# Patient Record
Sex: MALE | Race: WHITE | Employment: FULL TIME | URBAN - METROPOLITAN AREA
[De-identification: names, ages, dates, MRNs, and addresses within clinical notes are randomized per-mention and may not be internally consistent; named-entity substitution may affect disease eponyms.]

---

## 2024-07-24 ENCOUNTER — OFFICE VISIT (OUTPATIENT)
Age: 74
End: 2024-07-24
Payer: MEDICARE

## 2024-07-24 VITALS
HEIGHT: 72 IN | OXYGEN SATURATION: 96 % | RESPIRATION RATE: 18 BRPM | TEMPERATURE: 97 F | BODY MASS INDEX: 33.86 KG/M2 | WEIGHT: 250 LBS | HEART RATE: 72 BPM

## 2024-07-24 DIAGNOSIS — H60.391 ACUTE INFECTIVE OTITIS EXTERNA, RIGHT: Primary | ICD-10-CM

## 2024-07-24 PROCEDURE — 99203 OFFICE O/P NEW LOW 30 MIN: CPT | Performed by: PHYSICIAN ASSISTANT

## 2024-07-24 PROCEDURE — G0463 HOSPITAL OUTPT CLINIC VISIT: HCPCS | Performed by: PHYSICIAN ASSISTANT

## 2024-07-24 RX ORDER — OFLOXACIN 3 MG/ML
10 SOLUTION AURICULAR (OTIC) DAILY
Qty: 10 ML | Refills: 0 | Status: SHIPPED | OUTPATIENT
Start: 2024-07-24 | End: 2024-07-24

## 2024-07-24 RX ORDER — M-VIT,TX,IRON,MINS/CALC/FOLIC 27MG-0.4MG
TABLET ORAL
COMMUNITY

## 2024-07-24 RX ORDER — NEOMYCIN SULFATE, POLYMYXIN B SULFATE AND HYDROCORTISONE 10; 3.5; 1 MG/ML; MG/ML; [USP'U]/ML
4 SUSPENSION/ DROPS AURICULAR (OTIC) 4 TIMES DAILY
Qty: 10 ML | Refills: 0 | Status: SHIPPED | OUTPATIENT
Start: 2024-07-24

## 2024-07-24 NOTE — PROGRESS NOTES
[facility]        NAME: Oz Burt is a 73 y.o. male  : 1950    MRN: [id number]  DATE: 2024  TIME: 9:47 AM    Assessment and Plan   Acute infective otitis externa, right [H60.391]  1. Acute infective otitis externa, right  neomycin-polymyxin-hydrocortisone (CORTISPORIN) 0.35%-10,000 units/mL-1% otic suspension    DISCONTINUED: ofloxacin (FLOXIN) 0.3 % otic solution            Patient Instructions     Pt has otitis externa of the right ear which I will treat with topical Cortisporin otic drops and he is to keep ear otherwise dry and avoid any excessive water exposure. Rec OTC analgesics for pain. Once condition is resolved, he should then be seen for cerumen removal.   Follow up with PCP in 3-5 days.  Proceed to  ER if symptoms worsen.    If tests have been performed at [facility], our office will contact you with results if changes need to be made to the care plan discussed with you at the visit.  [facility]    Chief Complaint     Chief Complaint   Patient presents with    Earache     Pain started 10 days ago. Right ear, took his hearing aide and cleaning it and put hydrogen peroxide, starting ,monday he started leaking clear fluid, can't hear well from that ear.         History of Present Illness       Pt presents with over one week hx of acute right ear pain. He wears hearing aids and believes he has wax. He was using hydrogen peroxide in his right ear and now has pain, decreased hearing, otorrhea.         Review of Systems   Review of Systems   Constitutional: Negative.    HENT:  Positive for ear discharge (right), ear pain (right) and hearing loss (right).    Respiratory: Negative.     Cardiovascular: Negative.    Gastrointestinal: Negative.    Genitourinary: Negative.          Current Medications       Current Outpatient Medications:     Cholecalciferol 25 MCG (1000 UT) GO, Chew, Disp: , Rfl:     Menaquinone-7 40 MCG TABS, Take by  mouth, Disp: , Rfl:     neomycin-polymyxin-hydrocortisone (CORTISPORIN) 0.35%-10,000 units/mL-1% otic suspension, Administer 4 drops to the right ear 4 (four) times a day, Disp: 10 mL, Rfl: 0    therapeutic multivitamin-minerals (THERAGRAN-M) tablet, , Disp: , Rfl:     Current Allergies     Allergies as of 07/24/2024 - Reviewed 07/24/2024   Allergen Reaction Noted    Rabies vaccine Other (See Comments) 09/26/2008            The following portions of the patient's history were reviewed and updated as appropriate: allergies, current medications, past family history, past medical history, past social history, past surgical history and problem list.     Past Medical History:   Diagnosis Date    Arthritis        Past Surgical History:   Procedure Laterality Date    APPENDECTOMY         Family History   Problem Relation Age of Onset    Heart disease Mother     Hypertension Mother     Heart disease Father     Hypertension Father          Medications have been verified.        Objective   Pulse 72   Temp (!) 97 °F (36.1 °C)   Resp 18   Ht 6' (1.829 m)   Wt 113 kg (250 lb)   SpO2 96%   BMI 33.91 kg/m²   No LMP for male patient.       Physical Exam     Physical Exam  Vitals reviewed.   Constitutional:       General: He is not in acute distress.     Appearance: He is well-developed.   HENT:      Right Ear: External ear normal.      Left Ear: Tympanic membrane and external ear normal.      Ears:      Comments: Right EAC with canal edema, maceration. Unable to visualize TM. Left EAC with cerumen build-up but not impacted.  Neurological:      Mental Status: He is alert and oriented to person, place, and time.

## 2024-07-30 ENCOUNTER — OFFICE VISIT (OUTPATIENT)
Age: 74
End: 2024-07-30
Payer: MEDICARE

## 2024-07-30 VITALS
HEIGHT: 72 IN | SYSTOLIC BLOOD PRESSURE: 169 MMHG | RESPIRATION RATE: 18 BRPM | BODY MASS INDEX: 33.54 KG/M2 | HEART RATE: 70 BPM | WEIGHT: 247.6 LBS | OXYGEN SATURATION: 99 % | DIASTOLIC BLOOD PRESSURE: 88 MMHG | TEMPERATURE: 97.8 F

## 2024-07-30 DIAGNOSIS — H60.501 ACUTE OTITIS EXTERNA OF RIGHT EAR, UNSPECIFIED TYPE: Primary | ICD-10-CM

## 2024-07-30 PROCEDURE — G0463 HOSPITAL OUTPT CLINIC VISIT: HCPCS

## 2024-07-30 PROCEDURE — 99213 OFFICE O/P EST LOW 20 MIN: CPT

## 2024-07-30 RX ORDER — CIPROFLOXACIN AND DEXAMETHASONE 3; 1 MG/ML; MG/ML
SUSPENSION/ DROPS AURICULAR (OTIC)
COMMUNITY
Start: 2024-07-25

## 2024-07-30 RX ORDER — AMOXICILLIN AND CLAVULANATE POTASSIUM 875; 125 MG/1; MG/1
1 TABLET, FILM COATED ORAL EVERY 12 HOURS SCHEDULED
Qty: 20 TABLET | Refills: 0 | Status: SHIPPED | OUTPATIENT
Start: 2024-07-30 | End: 2024-08-09

## 2024-07-30 NOTE — PATIENT INSTRUCTIONS
Patient Education     Outer Ear Infection ED   General Information   You came to the Emergency Department (ED) for an outer ear infection. You may hear this called sanna’s ear, but you can get it even if you are not swimming. An outer ear infection happens when the skin in the ear canal is scratched or irritated and then gets infected. You may need antibiotics to treat the infection. If you are given ear drops, it is important to take all of them, even if you start to feel better.  What care is needed at home?   Call your regular doctor to let them know you were in the ED. Make a follow-up appointment if you were told to.  Take your ear drops as ordered.  Sit or lie down with your head to the side and the ear that needs the ear drops pointing up.  Pull on your ear to straighten the ear canal.  Gently pull the ear up and toward the back of the head to straighten it. If you are giving the ear drops to a child under 3 years of age, gently pull the earlobe down and toward the back of the head.  Put the correct number of drops in your ear.  Gently press the small skin flap over the ear to help the drops run into the ear.  Continue to sit or lie with your head to the side for 5 minutes.  Your doctor may want you to put a small cotton plug in your ear to help keep the drops in place.  Keep the inside of your ear dry while it heals. You can protect your ear when you shower with a petroleum jelly-coated cotton ball. Avoid swimming for 7 to 10 days.  Do not use in-ear head phones (ear buds) or hearing aids while your ear heals.  When do I need to call the doctor?   Your symptoms are not better within 2 days after starting treatment.  Your ear starts to bleed or drain pus.  You have a fever of 100.4°F (38°C)  You have new or worsening symptoms.  Last Reviewed Date   2020-08-03  Consumer Information Use and Disclaimer   This generalized information is a limited summary of diagnosis, treatment, and/or medication information.  It is not meant to be comprehensive and should be used as a tool to help the user understand and/or assess potential diagnostic and treatment options. It does NOT include all information about conditions, treatments, medications, side effects, or risks that may apply to a specific patient. It is not intended to be medical advice or a substitute for the medical advice, diagnosis, or treatment of a health care provider based on the health care provider's examination and assessment of a patient’s specific and unique circumstances. Patients must speak with a health care provider for complete information about their health, medical questions, and treatment options, including any risks or benefits regarding use of medications. This information does not endorse any treatments or medications as safe, effective, or approved for treating a specific patient. [employer] and its affiliates disclaim any warranty or liability relating to this information or the use thereof. The use of this information is governed by the Terms of Use, available at https://www.[employer].com/en/know/clinical-effectiveness-terms   Copyright   Copyright © 2024 [employer] and its affiliates and/or licensors. All rights reserved.

## 2024-07-30 NOTE — PROGRESS NOTES
[facility]        NAME: Oz Burt is a 73 y.o. male  : 1950    MRN: [id number]  DATE: 2024  TIME: 4:09 PM    Assessment and Plan   Acute otitis externa of right ear, unspecified type [H60.501]  1. Acute otitis externa of right ear, unspecified type  Ambulatory Referral to Otolaryngology    amoxicillin-clavulanate (AUGMENTIN) 875-125 mg per tablet      Patient failed topical treatment with Ciprodex. Will treat with oral Augmentin. Patient must follow up with ENT for further treatment. Patient verbalized understanding. Patient also requested me to look at his left eye as a tree branch hit it a couple of days ago. He stated it bothered him a little bit yesterday, but has improved and he just wanted me to look to make sure I did not see anything on exam. I offered to do fluorescein stain for foreign bodies and patient declines. He also declined lid eversion. He states he will follow up with ophthalmology as needed if his eye is bothering him.   Patient Instructions     Otitis externa diagnosed on exam today.  Antibiotics sent to the pharmacy. Follow up with PCP in 3-5 days if no improvement. Proceed to ER if symptoms worsen.    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 male presenting to the office today complaining of ear pain.   Symptoms have been present for 14 days, and include right ear pain and drainage. Patient was diagnosed 6 days ago with otitis externa, and was placed on Cortisporin. Patient believes drops have helped, but now the pain has returned. He was told he would also need to return for cerumen removal.    He has tried  Cortisporin for his symptoms, some relief.  Sick contacts include: none    Review of Systems     Review of Systems   Constitutional:  Negative for chills and fever.   HENT:  Positive for ear discharge, ear pain and sore throat. Negative for congestion and trouble swallowing.    Respiratory:  Negative for shortness of breath, wheezing and stridor.    Gastrointestinal:  Negative for nausea and vomiting.   Genitourinary: Negative.    Musculoskeletal: Negative.    Skin: Negative.    Neurological: Negative.        Current Medications       Current Outpatient Medications:     amoxicillin-clavulanate (AUGMENTIN) 875-125 mg per tablet, Take 1 tablet by mouth every 12 (twelve) hours for 10 days, Disp: 20 tablet, Rfl: 0    Cholecalciferol 25 MCG (1000 UT) CHEW, Chew, Disp: , Rfl:     ciprofloxacin-dexamethasone (CIPRODEX) otic suspension, PLACE 4 DROPS INTO THE AFFECTED EAR THREE TIMES A DAY FOR 1 WEEK, Disp: , Rfl:     Menaquinone-7 40 MCG TABS, Take by mouth, Disp: , Rfl:     therapeutic multivitamin-minerals (THERAGRAN-M) tablet, , Disp: , Rfl:     neomycin-polymyxin-hydrocortisone (CORTISPORIN) 0.35%-10,000 units/mL-1% otic suspension, Administer 4 drops to the right ear 4 (four) times a day (Patient not taking: Reported on 7/30/2024), Disp: 10 mL, Rfl: 0    Current Allergies     Allergies as of 07/30/2024 - Reviewed 07/30/2024   Allergen Reaction Noted    Rabies vaccine Other (See Comments) 09/26/2008            The following portions of the patient's history were reviewed and updated as appropriate: allergies, current medications, past family history, past medical history, past social history, past surgical history and problem list.     Past Medical History:   Diagnosis Date    Arthritis        Past Surgical History:   Procedure Laterality Date    APPENDECTOMY         Family History   Problem Relation Age of Onset    Heart disease Mother     Hypertension Mother     Heart disease Father     Hypertension Father         Medications have been verified.    Objective     /88   Pulse 70   Temp 97.8 °F (36.6 °C) (Tympanic)   Resp 18   Ht 6' (1.829 m)   Wt 112 kg (247 lb 9.6 oz)   SpO2 99%   BMI 33.58 kg/m²   No LMP for male patient.     Physical Exam     Physical Exam  Vitals and nursing note reviewed.   Constitutional:       General: He is not in acute distress.     Appearance: Normal appearance. He is well-developed and normal weight. He is not ill-appearing, toxic-appearing or diaphoretic.   HENT:      Head: Normocephalic and atraumatic.      Right Ear: Tympanic membrane normal. Drainage (purulent drainage), swelling and tenderness present. There is no impacted cerumen.      Left Ear: Tympanic membrane, ear canal and external ear normal. No drainage, swelling or tenderness.  No middle ear effusion. There is no impacted cerumen. Tympanic membrane is not erythematous or bulging.      Ears:      Comments: Unable to view TM of right ear       Nose: No congestion or rhinorrhea.      Mouth/Throat:      Mouth: Mucous membranes are moist. No oral lesions.      Pharynx: Uvula midline. No pharyngeal swelling, oropharyngeal exudate, posterior oropharyngeal erythema or uvula swelling.      Tonsils: No tonsillar exudate or tonsillar abscesses.   Eyes:      General: Lids are normal. Vision grossly intact. Gaze aligned appropriately. No allergic shiner, visual field deficit or scleral icterus.        Right eye: No discharge.         Left eye: No foreign body, discharge or hordeolum.      Conjunctiva/sclera: Conjunctivae normal.      Left eye: Left conjunctiva is not injected. No chemosis, exudate or hemorrhage.  Neck:      Thyroid: No thyromegaly.   Cardiovascular:      Rate and Rhythm: Normal rate and regular rhythm.      Pulses: Normal pulses.   Pulmonary:      Effort: Pulmonary effort is normal. No respiratory distress.   Musculoskeletal:         General: Normal range of motion.      Cervical back: Normal range of motion and  neck supple.   Lymphadenopathy:      Cervical: No cervical adenopathy.   Skin:     General: Skin is warm and dry.      Capillary Refill: Capillary refill takes less than 2 seconds.   Neurological:      General: No focal deficit present.      Mental Status: He is alert and oriented to person, place, and time.   Psychiatric:         Mood and Affect: Mood normal.         Behavior: Behavior normal.